# Patient Record
Sex: FEMALE | Race: WHITE | NOT HISPANIC OR LATINO | Employment: FULL TIME | URBAN - METROPOLITAN AREA
[De-identification: names, ages, dates, MRNs, and addresses within clinical notes are randomized per-mention and may not be internally consistent; named-entity substitution may affect disease eponyms.]

---

## 2023-01-13 ENCOUNTER — OFFICE VISIT (OUTPATIENT)
Dept: INFECTIOUS DISEASES | Facility: CLINIC | Age: 28
End: 2023-01-13
Payer: OTHER MISCELLANEOUS

## 2023-01-13 ENCOUNTER — LAB (OUTPATIENT)
Dept: LAB | Facility: HOSPITAL | Age: 28
End: 2023-01-13
Payer: OTHER MISCELLANEOUS

## 2023-01-13 ENCOUNTER — TELEPHONE (OUTPATIENT)
Dept: INFECTIOUS DISEASES | Facility: CLINIC | Age: 28
End: 2023-01-13
Payer: OTHER MISCELLANEOUS

## 2023-01-13 VITALS
HEART RATE: 82 BPM | RESPIRATION RATE: 12 BRPM | SYSTOLIC BLOOD PRESSURE: 114 MMHG | WEIGHT: 173.4 LBS | DIASTOLIC BLOOD PRESSURE: 80 MMHG | TEMPERATURE: 97.5 F
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
## 2023-01-13 DIAGNOSIS — Z77.21 EXPOSURE TO BODY FLUIDS BY CONTAMINATED HYPODERMIC NEEDLE STICK: Primary | ICD-10-CM

## 2023-01-13 DIAGNOSIS — Z77.21 EXPOSURE TO BODY FLUIDS BY CONTAMINATED HYPODERMIC NEEDLE STICK: ICD-10-CM

## 2023-01-13 DIAGNOSIS — Z11.4 ENCOUNTER FOR SCREENING FOR HIV: ICD-10-CM

## 2023-01-13 DIAGNOSIS — Z11.59 NEED FOR HEPATITIS C SCREENING TEST: ICD-10-CM

## 2023-01-13 LAB — HIV1+2 AB SER QL: NORMAL

## 2023-01-13 PROCEDURE — 36415 COLL VENOUS BLD VENIPUNCTURE: CPT

## 2023-01-13 PROCEDURE — 99203 OFFICE O/P NEW LOW 30 MIN: CPT | Performed by: STUDENT IN AN ORGANIZED HEALTH CARE EDUCATION/TRAINING PROGRAM

## 2023-01-13 PROCEDURE — 87522 HEPATITIS C REVRS TRNSCRPJ: CPT

## 2023-01-13 PROCEDURE — G0432 EIA HIV-1/HIV-2 SCREEN: HCPCS

## 2023-01-13 NOTE — TELEPHONE ENCOUNTER
----- Message from Todd Pizarro DO sent at 1/13/2023 12:05 PM EST -----  Would you care to let this lady know that the HIV test she had today was negative. Thanks!

## 2023-01-13 NOTE — PROGRESS NOTES
Chief Complaint  needlestick injury on the job    Subjective        Lenora Lassiter presents to [facility]  History of Present Illness    Patient is a 27-year-old female referred to infectious disease in the setting of needlestick injury with an index patient that was positive for HIV.    Patient was seen in occupational health clinic on 11/30/2022 after right thumb needlestick injury with an HIV-positive patient and obtained medications for Truvada and raltegravir for 28 days.  Testing at the time for HIV and hepatitis C were negative.  Hepatitis B surface antibody testing exemplified immunity.    Patient reports she had some difficulty tolerating the Truvada and raltegravir for the first week but this was only a little bit of nausea and fatigue.  This improved after the initial week and she completed her months worth of therapy without difficulty.  States she has not had any further symptoms.  The wound site has healed well without any difficulties.  She reports it was on an HIV-positive patient they were removing all his teeth with placing partials.  She states it was thoroughly irrigated after the wound.    Objective   Vital Signs:  /80   Pulse 82   Temp 97.5 °F (36.4 °C)   Resp 12   Wt 78.7 kg (173 lb 6.4 oz)   There is no height or weight on file to calculate BMI.       Physical Exam  Constitutional:       General: She is not in acute distress.     Appearance: Normal appearance. She is normal weight. She is not ill-appearing.   HENT:      Head: Normocephalic and atraumatic.      Nose: Nose normal.      Mouth/Throat:      Mouth: Mucous membranes are moist.   Eyes:      General: No scleral icterus.     Extraocular Movements: Extraocular movements intact.      Pupils: Pupils are equal, round, and reactive to light.   Cardiovascular:      Rate and Rhythm: Normal rate and regular rhythm.      Pulses: Normal pulses.   Pulmonary:      Effort: Pulmonary effort is normal. No  respiratory distress.      Breath sounds: Normal breath sounds.   Abdominal:      General: Abdomen is flat.      Palpations: Abdomen is soft.   Musculoskeletal:         General: Normal range of motion.      Cervical back: Normal range of motion and neck supple. No tenderness.   Skin:     General: Skin is warm and dry.   Neurological:      General: No focal deficit present.      Mental Status: She is alert and oriented to person, place, and time.   Psychiatric:         Mood and Affect: Mood normal.         Behavior: Behavior normal.        Result Review :  The following data was reviewed by: Todd Pizarro DO on 01/13/2023:      Data reviewed: Notes from employee health in regards to her recent lab work and needlestick injury    11/30 HIV negative  11/30 hepatitis C antibody negative  1/30 hepatitis B antibody positive         Assessment and Plan   Diagnoses and all orders for this visit:    1. Exposure to body fluids by contaminated hypodermic needle stick (Primary)  -     Hepatitis C RNA, Quantitative, PCR (graph); Future  -     HIV-1 / O / 2 Ag / Antibody 4th Generation; Future    2. Encounter for screening for HIV  -     HIV-1 / O / 2 Ag / Antibody 4th Generation; Future    3. Need for hepatitis C screening test  -     Hepatitis C RNA, Quantitative, PCR (graph); Future    Today we discussed her recent needlestick exposure and follow-up screening.  She has exemplified immunity to hepatitis B making this unlikely.  Hepatitis C screening was initially negative and she will need follow-up testing today with viral load.  Would likely not require any further testing after this.  As for HIV she has completed PEP and will need follow-up screening today.  We will also need to follow this up with testing at 3 months and 6 months after the exposure.  [place] and I have provided her written prescriptions for the screening.  It would likely be up to her to assure these repeats are obtained if she is  out of state but she should quickly get into HIV care if these were to be positive.       I spent 32 minutes caring for Lenora on this date of service. This time includes time spent by me in the following activities:preparing for the visit, reviewing tests, obtaining and/or reviewing a separately obtained history, performing a medically appropriate examination and/or evaluation , counseling and educating the patient/family/caregiver, ordering medications, tests, or procedures, documenting information in the medical record, independently interpreting results and communicating that information with the patient/family/caregiver and care coordination  Follow Up   No follow-ups on file.  Patient was given instructions and counseling regarding her condition or for health maintenance advice. Please see specific information pulled into the AVS if appropriate.

## 2023-01-17 LAB
HCV RNA SERPL NAA+PROBE-ACNC: NORMAL IU/ML
TEST INFORMATION: NORMAL